# Patient Record
Sex: MALE | Race: WHITE | NOT HISPANIC OR LATINO | Employment: FULL TIME | URBAN - METROPOLITAN AREA
[De-identification: names, ages, dates, MRNs, and addresses within clinical notes are randomized per-mention and may not be internally consistent; named-entity substitution may affect disease eponyms.]

---

## 2019-06-13 ENCOUNTER — OFFICE VISIT (OUTPATIENT)
Dept: URGENT CARE | Facility: CLINIC | Age: 59
End: 2019-06-13
Payer: OTHER MISCELLANEOUS

## 2019-06-13 VITALS
HEART RATE: 61 BPM | WEIGHT: 229 LBS | TEMPERATURE: 99 F | DIASTOLIC BLOOD PRESSURE: 90 MMHG | HEIGHT: 69 IN | SYSTOLIC BLOOD PRESSURE: 142 MMHG | OXYGEN SATURATION: 97 % | RESPIRATION RATE: 20 BRPM | BODY MASS INDEX: 33.92 KG/M2

## 2019-06-13 DIAGNOSIS — S43.402A SPRAIN OF LEFT SHOULDER, UNSPECIFIED SHOULDER SPRAIN TYPE, INITIAL ENCOUNTER: ICD-10-CM

## 2019-06-13 DIAGNOSIS — M25.512 ACUTE PAIN OF LEFT SHOULDER: Primary | ICD-10-CM

## 2019-06-13 PROCEDURE — 73030 X-RAY EXAM OF SHOULDER: CPT | Mod: FY,LT,S$GLB, | Performed by: RADIOLOGY

## 2019-06-13 PROCEDURE — 99204 PR OFFICE/OUTPT VISIT, NEW, LEVL IV, 45-59 MIN: ICD-10-PCS | Mod: S$GLB,,, | Performed by: NURSE PRACTITIONER

## 2019-06-13 PROCEDURE — 73030 XR SHOULDER TRAUMA 3 VIEW LEFT: ICD-10-PCS | Mod: FY,LT,S$GLB, | Performed by: RADIOLOGY

## 2019-06-13 PROCEDURE — 99204 OFFICE O/P NEW MOD 45 MIN: CPT | Mod: S$GLB,,, | Performed by: NURSE PRACTITIONER

## 2019-06-13 RX ORDER — CHLORHEXIDINE GLUCONATE ORAL RINSE 1.2 MG/ML
SOLUTION DENTAL
Refills: 0 | COMMUNITY
Start: 2019-05-18

## 2019-06-13 RX ORDER — ZOLPIDEM TARTRATE 10 MG/1
10 TABLET ORAL NIGHTLY
Refills: 3 | COMMUNITY
Start: 2019-05-17

## 2019-06-13 RX ORDER — IBUPROFEN 200 MG
800 TABLET ORAL
COMMUNITY
Start: 2019-06-13

## 2019-06-13 RX ORDER — TRIAMCINOLONE ACETONIDE 1 MG/G
CREAM TOPICAL
Refills: 0 | COMMUNITY
Start: 2019-05-09

## 2019-06-13 NOTE — LETTER
[facility]  [address], [address]  [facility] [address]  Phone: [phone]  Fax: [phone]  [facility]: [id number]    Pt Name: Shan Cuevas  Injury Date: 06/13/2019   Employee ID:  Date of FIRST Treatment: 06/13/2019   Company: [employer]      Appointment Time: NONE Arrived: 1135 AM   Provider: Sara Barnett NP Time Out: 1320 PM     Office Treatment:   EXAM   CRYOGEL  XRAYS  DRUG SCREEN  LIGHT DUTY      1. Acute pain of left shoulder    2. Sprain of left shoulder, unspecified shoulder sprain type, initial encounter      Medications Ordered This Encounter   Medications    ibuprofen (ADVIL,MOTRIN) 200 MG tablet      Patient Instructions: Attention not to aggravate affected area, Apply ice 24-48 hours then apply heat/warm soaks, Daily home exercises/warm soaks      Restrictions: Limited use of left hand and arm, No above the shoulder/overhead work     Return Appointment: 6/17/2019 at 1130 AM

## 2019-06-13 NOTE — PROGRESS NOTES
"Subjective:       Patient ID: Shan Cuevas is a 58 y.o. male.    Chief Complaint: Shoulder Injury (Left)    NEW WC INJ of Left Shoulder ( DOI 06-13-19 ) while working he crawling under some duct work when his left hand slipped out from under him and he felt a POP in his left shoulder. Pain score today is 8/10 with complaints of constant stabbing pain when trying to move his left arm but No numbness reported in hand/fingers. ZO   No previous injury. He is R handed. At the time of injury he was in a "spread eagle" position bearing weight on his L hand/arm when he felt a pop and his L shoulder "went out". MWT    Review of Systems   Cardiovascular: Negative for chest pain.   Respiratory: Negative for shortness of breath.    Musculoskeletal: Positive for joint pain and stiffness. Negative for joint swelling and neck pain.   Gastrointestinal: Negative for abdominal pain, nausea and vomiting.   Neurological: Negative for numbness.   Psychiatric/Behavioral: The patient has insomnia.    All other systems reviewed and are negative.      Objective:      Physical Exam   Constitutional: He is oriented to person, place, and time. He appears well-developed and well-nourished. No distress.   HENT:   Right Ear: External ear normal.   Left Ear: External ear normal.   Nose: Nose normal.   Eyes: Conjunctivae are normal.   Neck: Normal range of motion.   Cardiovascular: Normal rate, regular rhythm, normal heart sounds and intact distal pulses.   Pulmonary/Chest: Effort normal and breath sounds normal.   Abdominal: Soft. Bowel sounds are normal.   Musculoskeletal: He exhibits tenderness.        Left shoulder: He exhibits decreased range of motion, tenderness, pain and decreased strength. He exhibits no swelling, no deformity and normal pulse.        Arms:  ROM very limited by c/o pain. Abduction approx 25 degrees. Flexion 30 degrees. Less pain with extension. Less pain with internal rotation and lift off test. Unable to fully assess " shoulder due to pain. No ecchymosis or swelling appreciated.   Neurological: He is alert and oriented to person, place, and time.   Skin: Skin is warm and dry. Capillary refill takes less than 2 seconds. No erythema.   Psychiatric: He has a normal mood and affect. His behavior is normal. Judgment and thought content normal.   Vitals reviewed.      Assessment:       1. Acute pain of left shoulder    2. Sprain of left shoulder, unspecified shoulder sprain type, initial encounter        Plan:     X-ray Shoulder Trauma 3 View Left    Result Date: 6/13/2019  EXAMINATION: XR SHOULDER TRAUMA 3 VIEW LEFT CLINICAL HISTORY: Pain in left shoulder TECHNIQUE: Three views of the left shoulder were performed. COMPARISON None FINDINGS: Mild DJD.  No fracture or dislocation.  No bone destruction identified     See above Electronically signed by: Willie Bernstein MD Date:    06/13/2019 Time:    12:46    Medications Ordered This Encounter   Medications    ibuprofen (ADVIL,MOTRIN) 200 MG tablet     Sig: Take 4 tablets (800 mg total) by mouth 3 (three) times daily with meals. Monitor BP.     Patient Instructions: Attention not to aggravate affected area, Apply ice 24-48 hours then apply heat/warm soaks, Daily home exercises/warm soaks   Restrictions: Limited use of left hand and arm, No above the shoulder/overhead work  Follow up in about 4 days (around 6/17/2019).

## 2019-06-17 ENCOUNTER — OFFICE VISIT (OUTPATIENT)
Dept: URGENT CARE | Facility: CLINIC | Age: 59
End: 2019-06-17
Payer: OTHER MISCELLANEOUS

## 2019-06-17 DIAGNOSIS — M25.512 ACUTE PAIN OF LEFT SHOULDER: ICD-10-CM

## 2019-06-17 DIAGNOSIS — S43.402A SPRAIN OF LEFT SHOULDER, UNSPECIFIED SHOULDER SPRAIN TYPE, INITIAL ENCOUNTER: Primary | ICD-10-CM

## 2019-06-17 PROCEDURE — 99214 OFFICE O/P EST MOD 30 MIN: CPT | Mod: S$GLB,,, | Performed by: PREVENTIVE MEDICINE

## 2019-06-17 PROCEDURE — 99214 PR OFFICE/OUTPT VISIT, EST, LEVL IV, 30-39 MIN: ICD-10-PCS | Mod: S$GLB,,, | Performed by: PREVENTIVE MEDICINE

## 2019-06-17 NOTE — LETTER
[facility] [place]  [address], [address]  [place] [address]  Phone: [phone]  Fax: [phone]  [facility]: [id number]    Pt Name: Shan Cuevas  Injury Date: 06/13/2019   Employee ID:  Date of Treatment: 06/17/2019   Company: [employer]      Appointment Time: 11:30 AM Arrived: 1130 AM   Provider: Akash Gutiérrez MD Time Out: 1240 PM      Office Treatment:   EXAM   MRI ONCE APPROVED   LIGHT DUTY    1. Sprain of left shoulder, unspecified shoulder sprain type, initial encounter    2. Acute pain of left shoulder          Patient Instructions: Daily home exercises/warm soaks, Attention not to aggravate affected area, MRI to be scheduled once authorized(Continue ibuprofen for swelling and pain)      Restrictions: LIMITED USE OF THE LEFT ARM, LIGHT DUTY     Return Appointment: 6/24/2019 at 2:30 PM

## 2019-06-17 NOTE — LETTER
[facility] [place]  [address]  [place] [address]  Phone: [phone]  Fax: [phone]  [facility]: [id number]    Pt Name: Shan Cuevas  Injury Date: 06/13/2019   Employee ID:  Date of Treatment: 06/17/2019   Company: [employer]      Appointment Time: 11:30 AM Arrived: 1130 AM   Provider: Akash Gutiérrez MD Time Out: 1240 PM      Office Treatment:   EXAM   MRI ONCE APPROVED   REGULAR DUTY    1. Sprain of left shoulder, unspecified shoulder sprain type, initial encounter    2. Acute pain of left shoulder          Patient Instructions: Daily home exercises/warm soaks, Attention not to aggravate affected area, MRI to be scheduled once authorized(Continue ibuprofen for swelling and pain)      Restrictions: Regular Duty     Return Appointment: 6/24/2019 at 2:30 PM

## 2019-06-17 NOTE — PROGRESS NOTES
Subjective:       Patient ID: Shan Cuevas is a 58 y.o. male.    Chief Complaint: Shoulder Injury (LEFT Shoulder)    WC RV for Left Shoulder ( DOI 06-13-19 ) patinet reports no improvement.  8/10.  CT    Review of Systems   Cardiovascular: Negative for chest pain.   Respiratory: Negative for shortness of breath.    Musculoskeletal: Positive for joint pain and stiffness. Negative for joint swelling and neck pain.   Gastrointestinal: Negative for abdominal pain, nausea and vomiting.   Neurological: Negative for numbness.   Psychiatric/Behavioral: The patient has insomnia.    All other systems reviewed and are negative.      Objective:      Physical Exam   Constitutional: He is oriented to person, place, and time. He appears well-developed and well-nourished.   HENT:   Head: Normocephalic.   Right Ear: External ear normal.   Left Ear: External ear normal.   Eyes: Pupils are equal, round, and reactive to light.   Neck: Normal range of motion.   Cardiovascular: Normal rate, regular rhythm and normal heart sounds.   Pulmonary/Chest: Effort normal and breath sounds normal.   Musculoskeletal:        Left shoulder: He exhibits decreased range of motion, tenderness, pain and spasm. He exhibits no bony tenderness, no swelling, no effusion, no crepitus, no deformity, no laceration, normal pulse and normal strength.        Arms:  Pain persists with palpation of left shoulder. Very limited range of motion of left shoulder with complaints of pain with flexion, extension and abduction of left shoulder. Pain with internal and external rotation. Positive empty can test suggest rotator cuff tear.   Neurological: He is alert and oriented to person, place, and time.   Skin: Skin is warm and dry.   Psychiatric: He has a normal mood and affect.   Nursing note and vitals reviewed.      Assessment:       1. Sprain of left shoulder, unspecified shoulder sprain type, initial encounter    2. Acute pain of left shoulder        Plan:             Patient Instructions: Daily home exercises/warm soaks, Attention not to aggravate affected area, MRI to be scheduled once authorized(Continue ibuprofen for swelling and pain)   Restrictions: Limited use of left hand and arm, No above the shoulder/overhead work  Follow up in about 1 week (around 6/24/2019).

## 2019-06-24 ENCOUNTER — OFFICE VISIT (OUTPATIENT)
Dept: URGENT CARE | Facility: CLINIC | Age: 59
End: 2019-06-24
Payer: OTHER MISCELLANEOUS

## 2019-06-24 DIAGNOSIS — S43.402D SPRAIN OF LEFT SHOULDER, UNSPECIFIED SHOULDER SPRAIN TYPE, SUBSEQUENT ENCOUNTER: Primary | ICD-10-CM

## 2019-06-24 DIAGNOSIS — M25.512 ACUTE PAIN OF LEFT SHOULDER: ICD-10-CM

## 2019-06-24 PROCEDURE — 99213 PR OFFICE/OUTPT VISIT, EST, LEVL III, 20-29 MIN: ICD-10-PCS | Mod: S$GLB,,, | Performed by: NURSE PRACTITIONER

## 2019-06-24 PROCEDURE — 99213 OFFICE O/P EST LOW 20 MIN: CPT | Mod: S$GLB,,, | Performed by: NURSE PRACTITIONER

## 2019-06-24 NOTE — LETTER
[facility]  [address], [address]  [facility] [address]  Phone: [phone]  Fax: [phone]  [facility]: [id number]    Pt Name: Shan Cuevas  Injury Date: 06/13/2019   Employee ID:  Date of Treatment: 06/24/2019   Company: [employer]      Appointment Time: 1430 PM Arrived: 1425 PM    Provider: Sara Barnett NP Time Out: 1550 PM      Office Treatment:   EXAM  MRI SCHEDULED FOR 06/25/19  LIGHT DUTY    1. Sprain of left shoulder, unspecified shoulder sprain type, subsequent encounter    2. Acute pain of left shoulder          Patient Instructions: Attention not to aggravate affected area, Daily home exercises/warm soaks, MRI to be scheduled once authorized      Restrictions: Limited use of left hand and arm, No above the shoulder/overhead work     Return Appointment: 6/26/2019 at 12:00 PM

## 2019-06-24 NOTE — PROGRESS NOTES
Subjective:       Patient ID: Shan Cuevas is a 58 y.o. male.    Chief Complaint: Shoulder Pain (LEFT)    WC RV for Left Shoulder ( DOI 06-13-19 ) patient reports no improvement.  8/10.  CT Says no one called him about his MRI. MWT    Shoulder Pain    Associated symptoms include stiffness. Pertinent negatives include no numbness.     Review of Systems   Cardiovascular: Negative for chest pain.   Respiratory: Negative for shortness of breath.    Musculoskeletal: Positive for joint pain, muscle weakness and stiffness. Negative for joint swelling and neck pain.   Gastrointestinal: Negative for abdominal pain, nausea and vomiting.   Neurological: Negative for numbness.   Psychiatric/Behavioral: The patient has insomnia.    All other systems reviewed and are negative.      Objective:      Physical Exam   Constitutional: He is oriented to person, place, and time. He appears well-developed and well-nourished.   HENT:   Right Ear: External ear normal.   Left Ear: External ear normal.   Nose: Nose normal.   Eyes: Conjunctivae are normal.   Neck: Normal range of motion.   Cardiovascular: Intact distal pulses.   Pulmonary/Chest: Effort normal.   Musculoskeletal: He exhibits tenderness.        Left shoulder: He exhibits decreased range of motion, tenderness and pain. He exhibits no swelling and normal pulse.        Arms:  Pain persists to L shoulder. ROM very limited by c/o pain. Most pain with abduction, flexion and extension. Less pain with internal & external rotation. Positive empty can test.   Neurological: He is alert and oriented to person, place, and time.   Skin: Skin is warm and dry. Capillary refill takes less than 2 seconds. No erythema.   Psychiatric: He has a normal mood and affect. His behavior is normal. Judgment and thought content normal.       Assessment:       1. Sprain of left shoulder, unspecified shoulder sprain type, subsequent encounter    2. Acute pain of left shoulder        Plan:       Called [facility]  in the [facility] regarding MRI. It has been authorized. They will call pt today to schedule. Pt will return here later this week for MRI results.     Continue medications as prescribed.  Patient Instructions: Attention not to aggravate affected area, Daily home exercises/warm soaks, MRI to be scheduled once authorized   Restrictions: Limited use of left hand and arm, No above the shoulder/overhead work  Follow up in about 4 days (around 6/28/2019).

## 2019-06-25 ENCOUNTER — HOSPITAL ENCOUNTER (OUTPATIENT)
Dept: RADIOLOGY | Facility: HOSPITAL | Age: 59
Discharge: HOME | End: 2019-06-25
Attending: PREVENTIVE MEDICINE
Payer: OTHER MISCELLANEOUS

## 2019-06-25 ENCOUNTER — HOSPITAL ENCOUNTER (OUTPATIENT)
Dept: RADIOLOGY | Facility: HOSPITAL | Age: 59
Discharge: HOME | End: 2019-06-25
Attending: RADIOLOGY
Payer: OTHER MISCELLANEOUS

## 2019-06-25 DIAGNOSIS — S05.40XA: ICD-10-CM

## 2019-06-25 DIAGNOSIS — M25.512 ACUTE PAIN OF LEFT SHOULDER: ICD-10-CM

## 2019-06-25 DIAGNOSIS — S43.402A SPRAIN OF LEFT SHOULDER, UNSPECIFIED SHOULDER SPRAIN TYPE, INITIAL ENCOUNTER: ICD-10-CM

## 2019-06-25 PROCEDURE — 73221 MRI JOINT UPR EXTREM W/O DYE: CPT | Mod: TC,LT

## 2019-06-25 PROCEDURE — 70140 X-RAY EXAM OF FACIAL BONES: CPT | Mod: TC

## 2019-06-26 ENCOUNTER — OFFICE VISIT (OUTPATIENT)
Dept: URGENT CARE | Facility: CLINIC | Age: 59
End: 2019-06-26
Payer: OTHER MISCELLANEOUS

## 2019-06-26 DIAGNOSIS — M25.512 ACUTE PAIN OF LEFT SHOULDER: Primary | ICD-10-CM

## 2019-06-26 DIAGNOSIS — M75.122 COMPLETE TEAR OF LEFT ROTATOR CUFF: ICD-10-CM

## 2019-06-26 DIAGNOSIS — S43.432D TEAR OF LEFT GLENOID LABRUM, SUBSEQUENT ENCOUNTER: ICD-10-CM

## 2019-06-26 PROCEDURE — 99214 PR OFFICE/OUTPT VISIT, EST, LEVL IV, 30-39 MIN: ICD-10-PCS | Mod: S$GLB,,, | Performed by: NURSE PRACTITIONER

## 2019-06-26 PROCEDURE — 99214 OFFICE O/P EST MOD 30 MIN: CPT | Mod: S$GLB,,, | Performed by: NURSE PRACTITIONER

## 2019-06-26 NOTE — LETTER
[facility]  [address], [address]  [facility] [address]  Phone: [phone]  Fax: [phone]  [facility]: [id number]    Pt Name: Shan Cuevas  Injury Date: 06/13/2019   Employee ID: [id number] Date of Treatment: 06/26/2019   Company:  [employer]      Appointment Time: 12:00 PM Arrived: 11:55 AM   Provider: Sara Barnett NP Time Out: 12:59 PM     Office Treatment:   1. Acute pain of left shoulder    2. Complete tear of left rotator cuff    3. Tear of left glenoid labrum, subsequent encounter          Patient Instructions: Attention not to aggravate affected area, Referral to specialist to be scheduled, once authorized    Restrictions: Discharged to Ortho/Neuro/Opthomologist/Surgeon, Limited use of left hand and arm, No above the shoulder/overhead work     KD

## 2019-06-26 NOTE — PROGRESS NOTES
Subjective:       Patient ID: Shan Cuevas is a 58 y.o. male.    Chief Complaint: Shoulder Pain (Left)    Pt is being seen today for a Left Shoulder injury from 6/13/19.  He states he continues to have limited ROM.  His pain level today is 8/10.  KD    Shoulder Pain    Associated symptoms include stiffness. Pertinent negatives include no numbness.     Review of Systems   Constitution: Negative for malaise/fatigue.   HENT: Negative for nosebleeds.    Cardiovascular: Negative for chest pain and syncope.   Respiratory: Negative for shortness of breath.    Musculoskeletal: Positive for joint pain and stiffness. Negative for back pain and neck pain.   Gastrointestinal: Negative for abdominal pain.   Genitourinary: Negative for hematuria.   Neurological: Negative for dizziness, numbness and weakness.   All other systems reviewed and are negative.      Objective:      Physical Exam   Constitutional: He is oriented to person, place, and time. He appears well-developed and well-nourished. No distress.   HENT:   Right Ear: External ear normal.   Left Ear: External ear normal.   Nose: Nose normal.   Eyes: Conjunctivae are normal.   Neck: Normal range of motion.   Cardiovascular: Intact distal pulses.   Pulmonary/Chest: Effort normal.   Musculoskeletal: He exhibits tenderness.        Left shoulder: He exhibits decreased range of motion, tenderness, pain and decreased strength. He exhibits normal pulse.        Arms:  Pain to L shoulder. ROM limited by pain. Most pain with abduction, flexion & extension. Positive empty can test.   Neurological: He is alert and oriented to person, place, and time.   Skin: Skin is warm and dry. Capillary refill takes less than 2 seconds.   Psychiatric: He has a normal mood and affect. His behavior is normal. Judgment and thought content normal.       Assessment:       1. Acute pain of left shoulder    2. Complete tear of left rotator cuff    3. Tear of left glenoid labrum, subsequent encounter         Plan:     Continue medications as prescribed.    Reviewed MRI report with patient. Dr Gutiérrez also reviewed MRI and dicussed treatment plan. Pt prefers to see Orthopedist in [place] area if possible.  Copy of MRI report given to patient.     Patient Instructions: Attention not to aggravate affected area, Referral to specialist to be scheduled, once authorized   Restrictions: Discharged to Ortho/Neuro/Opthomologist/Surgeon, Limited use of left hand and arm, No above the shoulder/overhead work  Follow up if symptoms worsen or fail to improve.